# Patient Record
Sex: MALE | NOT HISPANIC OR LATINO | Employment: FULL TIME | URBAN - METROPOLITAN AREA
[De-identification: names, ages, dates, MRNs, and addresses within clinical notes are randomized per-mention and may not be internally consistent; named-entity substitution may affect disease eponyms.]

---

## 2024-01-29 ENCOUNTER — CLINICAL SUPPORT (OUTPATIENT)
Dept: AUDIOLOGY | Facility: CLINIC | Age: 69
End: 2024-01-29
Payer: COMMERCIAL

## 2024-01-29 ENCOUNTER — CLINICAL SUPPORT (OUTPATIENT)
Dept: AUDIOLOGY | Facility: CLINIC | Age: 69
End: 2024-01-29
Payer: MEDICARE

## 2024-01-29 DIAGNOSIS — H93.13 TINNITUS OF BOTH EARS: ICD-10-CM

## 2024-01-29 DIAGNOSIS — H90.3 SENSORINEURAL HEARING LOSS (SNHL) OF BOTH EARS: Primary | ICD-10-CM

## 2024-01-29 PROCEDURE — HRANC PR HEARING AID NO CHARGE: Performed by: AUDIOLOGIST

## 2024-01-29 NOTE — PROGRESS NOTES
Hearing Aid Evaluation  Time of Appointment:  Chief Complaint   Patient presents with    Hearing Loss     HAE / HCS       This patient was seen for an HAE. The patient has a benefit through [employer].    Demonstrated Phonak Audeo hearing aids in the office today.  The patient was pleased with the sound and fit.    Order placed today: 2 PHONAK AUDEO L-90 R WITH #1 LENGTH M RECEIVERS AND SMALL OPEN DOMES IN P6 COLOR.    The patient paid NO CHARGE for today's visit.    Return for hearing aid fitting that was scheduled today FOR [facility].    Time of Appointment: 35 MINUTES

## 2024-01-29 NOTE — PROGRESS NOTES
AUDIOLOGY ADULT AUDIOMETRIC EVALUATION    Name:  Dimas Pérez  :  1955  Age:  68 y.o.  Date of Evaluation:  2024    Reason for visit: Patient is seen in the clinic today at the request of otolaryngology for an audiologic evaluation.     HISTORY  Patient complains of not hearing well now and ready for hearing aids. Patient has [employer] for a benefit.    EVALUATION  See scanned audiogram: “Media” > “Audiology Report”.      RESULTS  Otoscopic Evaluation:  Right Ear: clear ear canal  Left Ear: clear ear canal    Immittance Measures:  Tympanometry:  Right Ear: Type A, normal tympanic membrane mobility with normal middle ear pressure   Left Ear: Type A, normal tympanic membrane mobility with normal middle ear pressure     Acoustic Reflexes:  Ipsilateral Right Ear:  NR NR NR NR   Ipsilateral Left Ear:    NR NR NR NR  Contralateral Right Ear: did not evaluate  Contralateral Left Ear: did not evaluate    Distortion Product Otoacoustic Emissions (DPOAEs):  Right Ear: DNT  Left Ear:   DNT     Audiometry:  Test Technique and Reliability: BEHAVIORAL   Standard audiometry via supra-aural headphones. Reliability is good/ FAIR.  FALSE POSITIVES    Pure tone air and bone conduction audiometry:  Right Ear: WNL TO 1 K HZ WITH A MILD MODERATELY SEVERE SNHL STABLE FROM 2023  Left Ear: WNL TO 1 K HZ AND MILD TO MODERATE TO SEVERE SNHL STABLE FROM 2023    Speech Audiometry (Word Recognition Scores):   Right Ear: 96% GOOD  Left Ear:    96% GOOD    IMPRESSIONS  STABLE MILD MODERATE TO SEVERE SNHL IN BOTH EARS.  The presence of acoustic reflexes within normal intensity limits is consistent with normal middle ear and brainstem function, and suggests that auditory sensitivity is not significantly impaired. An elevated or absent acoustic reflex threshold is consistent with a middle ear disorder, hearing loss in the stimulated ear, and/or interruption of neural innervation of the stapedius muscle. Present DPOAEs  suggest normal/near normal cochlear outer hair cell function and are consistent with no greater than a mild hearing loss at those frequencies. Absent DPOAEs are consistent with abnormal cochlear outer hair cell function and some degree of hearing loss at those frequencies.    RECOMMENDATIONS  - Follow up with otolaryngology today as scheduled.  - Audiologic evaluation as needed.  - Annual audiologic evaluation, sooner if an acute change is noted.  - Audiologic evaluation in conjunction with otologic care, if an acute change is noted, and/or annually.  - Consider hearing aids. [facility]  HAE TODAY AFTER AUDIO. - Follow-up with audiology annually for routine hearing aid maintenance, sooner if questions/problems arise.  - Follow-up with medical care team as planned.    PATIENT EDUCATION  Discussed results, impressions and recommendations with the patient. Questions were addressed and the patient was encouraged to contact our office should concerns arise.    Time for this encounter: 35 MINUTES

## 2024-02-19 ENCOUNTER — CLINICAL SUPPORT (OUTPATIENT)
Dept: AUDIOLOGY | Facility: CLINIC | Age: 69
End: 2024-02-19
Payer: COMMERCIAL

## 2024-02-19 DIAGNOSIS — H90.3 SENSORINEURAL HEARING LOSS (SNHL) OF BOTH EARS: Primary | ICD-10-CM

## 2024-02-19 PROCEDURE — HRANC PR HEARING AID NO CHARGE: Performed by: AUDIOLOGIST

## 2024-02-19 NOTE — PROGRESS NOTES
HEARING AID FITTING  [facility]    TIME OF APPOINTMENT: 9:00- 10:00    RIGHT: PHONAK AUDEO L 90 R #1 LENGTH , M  AND LARGE OPEN DOME  SN: [id number]  LEFT:PHONAK AUDEO L 90 R #1 LENGTH , M  AND LARGE OPEN DOME  SN: [id number]  FIT DATE:2/19/24  60 DAY TRIAL PERIOD: 4/19 2024  WARRANTY: 2/19/2027    This patient was seen for a H A FITTING:  VERY PLEASANT COUPLE HERE TODAY FOR MR. JUAREZ'S HEARING AID FITTING THROUGH [facility].  PATIENT LEARNED WELL HOW TO INSERT AND REMOVE AIDS WITH AND WITHOUT A MIRROR.  HE IS SET CURRENTLY AT 90 % OF [employer] AND DID RUN FB  ON BOTH AIDS. NO OTHER CHANGES.  SHOWED PATIENT AND HE LEARNED USE OF VC AND CLEAN AND CHECK AND WAX FILTER CHANGE.   WILL REVIEW ALL OF THE ABOVE IN 2 WEEEK CHECK AND GAVE PATIENT PHONE NUMBER FOR PHONAK IF DESIRES TO CONNECT FOR PHONE CALLS OR HOLLIE.    Set the devices to 90%. PATIENT SEEMS VERY WELL ADJUSTED TO AIDS ALREADY AND SPOUSE IS SUPPORTIVE AND KNOWLEDGABLE ABOUT THE AIDS.    Signed the Purchase Agreement and HIPAA forms.  The patient manipulated the devices well.  They were pleased with the sound and fit. SENT COPY OF  AND LETICIAA TO [facility] AND SCANNED INTO [employer].    The patient paid their balance in full AT TIME OF ORDER.  Return in 2 weeks to review changing WaxTraps, or sooner if needed.    [facility] WILL PAY EENT $625.00 EACH IF PATIENT KEEPS AIDS IN 2 MONTHS TIME.

## 2024-03-04 ENCOUNTER — CLINICAL SUPPORT (OUTPATIENT)
Dept: AUDIOLOGY | Facility: CLINIC | Age: 69
End: 2024-03-04
Payer: COMMERCIAL

## 2024-03-04 DIAGNOSIS — H90.3 SENSORINEURAL HEARING LOSS (SNHL) OF BOTH EARS: Primary | ICD-10-CM

## 2024-03-04 PROCEDURE — HRANC PR HEARING AID NO CHARGE: Performed by: AUDIOLOGIST

## 2024-03-04 NOTE — PROGRESS NOTES
HEARING AID CHECK    RIGHT:RIGHT: PHONAK AUDEO L 90 R #1 LENGTH , M  AND LARGE OPEN DOME  SN: [id number]  LEFT:PHONAK AUDEO L 90 R #1 LENGTH , M  AND LARGE OPEN DOME  SN: [id number]  FIT DATE:2/19/24  60 DAY TRIAL PERIOD: 4/19 2024  WARRANTY: 2/19/2027   Today: 3/4/24    This patient was seen for a H A check #1.  VERY PLEASANT COUPLE HERE TODAY FOR MR. JUAREZ'S HEARING AID FITTING THROUGH [facility].  PATIENT LEARNED WELL HOW TO INSERT AND REMOVE AIDS WITH AND WITHOUT A MIRROR.  HE IS SET today   % OF TARGET AND increased s-I-n overall as in lots of noise in daily life. NO OTHER CHANGES.  SHOWED PATIENT AND HE LEARNED USE OF VC AND CLEAN AND CHECK AND WAX FILTER CHANGE.   WILL REVIEW ALL OF THE ABOVE IN 2 WEEEK CHECK AND GAVE PATIENT PHONE NUMBER FOR PHONAK IF DESIRES TO CONNECT FOR PHONE CALLS OR HOLLIE.  Will do Speechmapping in 2 weeks.      Otoscopy:  clear TM      The patient paid  NO CHARGE  for today's visit.  Return AS SCHEDULED.    APPOINTMENT TIME: 10:30- 11:10

## 2024-04-22 ENCOUNTER — OFFICE VISIT (OUTPATIENT)
Dept: PRIMARY CARE | Facility: CLINIC | Age: 69
End: 2024-04-22
Payer: COMMERCIAL

## 2024-04-22 ENCOUNTER — APPOINTMENT (OUTPATIENT)
Dept: AUDIOLOGY | Facility: CLINIC | Age: 69
End: 2024-04-22

## 2024-04-22 VITALS
WEIGHT: 183.6 LBS | BODY MASS INDEX: 27.92 KG/M2 | HEART RATE: 57 BPM | OXYGEN SATURATION: 98 % | DIASTOLIC BLOOD PRESSURE: 78 MMHG | SYSTOLIC BLOOD PRESSURE: 136 MMHG | TEMPERATURE: 97.6 F

## 2024-04-22 DIAGNOSIS — N52.9 ERECTILE DYSFUNCTION, UNSPECIFIED ERECTILE DYSFUNCTION TYPE: ICD-10-CM

## 2024-04-22 DIAGNOSIS — J02.9 ACUTE PHARYNGITIS, UNSPECIFIED ETIOLOGY: Primary | ICD-10-CM

## 2024-04-22 PROBLEM — Z94.2 HISTORY OF LUNG TRANSPLANT (MULTI): Chronic | Status: ACTIVE | Noted: 2022-04-22

## 2024-04-22 PROBLEM — H90.3 SENSORINEURAL HEARING LOSS, BILATERAL: Status: ACTIVE | Noted: 2024-04-22

## 2024-04-22 PROBLEM — K21.00 GASTROESOPHAGEAL REFLUX DISEASE WITH ESOPHAGITIS WITHOUT HEMORRHAGE: Chronic | Status: ACTIVE | Noted: 2022-04-22

## 2024-04-22 PROBLEM — I10 ESSENTIAL HYPERTENSION: Chronic | Status: ACTIVE | Noted: 2022-04-22

## 2024-04-22 PROBLEM — F41.9 ANXIETY DISORDER: Status: ACTIVE | Noted: 2024-04-22

## 2024-04-22 PROBLEM — N18.30 CKD (CHRONIC KIDNEY DISEASE) STAGE 3, GFR 30-59 ML/MIN (MULTI): Chronic | Status: ACTIVE | Noted: 2018-11-27

## 2024-04-22 PROBLEM — E78.00 HIGH CHOLESTEROL: Status: ACTIVE | Noted: 2024-04-22

## 2024-04-22 PROBLEM — J43.8 OTHER EMPHYSEMA (MULTI): Status: ACTIVE | Noted: 2024-04-22

## 2024-04-22 PROBLEM — E78.5 DYSLIPIDEMIA: Status: ACTIVE | Noted: 2024-04-22

## 2024-04-22 PROBLEM — J45.909 ASTHMA (HHS-HCC): Status: ACTIVE | Noted: 2024-04-22

## 2024-04-22 PROBLEM — J44.89: Status: ACTIVE | Noted: 2024-04-22

## 2024-04-22 LAB — POC RAPID STREP: NEGATIVE

## 2024-04-22 PROCEDURE — 1159F MED LIST DOCD IN RCRD: CPT | Performed by: FAMILY MEDICINE

## 2024-04-22 PROCEDURE — 87081 CULTURE SCREEN ONLY: CPT | Performed by: FAMILY MEDICINE

## 2024-04-22 PROCEDURE — 3075F SYST BP GE 130 - 139MM HG: CPT | Performed by: FAMILY MEDICINE

## 2024-04-22 PROCEDURE — 3078F DIAST BP <80 MM HG: CPT | Performed by: FAMILY MEDICINE

## 2024-04-22 PROCEDURE — 1036F TOBACCO NON-USER: CPT | Performed by: FAMILY MEDICINE

## 2024-04-22 PROCEDURE — 87880 STREP A ASSAY W/OPTIC: CPT | Performed by: FAMILY MEDICINE

## 2024-04-22 PROCEDURE — 99214 OFFICE O/P EST MOD 30 MIN: CPT | Performed by: FAMILY MEDICINE

## 2024-04-22 PROCEDURE — 1125F AMNT PAIN NOTED PAIN PRSNT: CPT | Performed by: FAMILY MEDICINE

## 2024-04-22 RX ORDER — POLYETHYLENE GLYCOL-3350 AND ELECTROLYTES 236; 6.74; 5.86; 2.97; 22.74 G/274.31G; G/274.31G; G/274.31G; G/274.31G; G/274.31G
4000 POWDER, FOR SOLUTION ORAL ONCE
COMMUNITY
Start: 2023-11-14 | End: 2024-04-22 | Stop reason: ALTCHOICE

## 2024-04-22 RX ORDER — SILDENAFIL 100 MG/1
100 TABLET, FILM COATED ORAL DAILY PRN
Qty: 12 TABLET | Refills: 3 | Status: SHIPPED | OUTPATIENT
Start: 2024-04-22 | End: 2025-04-22

## 2024-04-22 RX ORDER — AMOXICILLIN 500 MG/1
500 CAPSULE ORAL EVERY 12 HOURS SCHEDULED
Qty: 14 CAPSULE | Refills: 0 | Status: SHIPPED | OUTPATIENT
Start: 2024-04-22 | End: 2024-04-29

## 2024-04-22 RX ORDER — PANTOPRAZOLE SODIUM 40 MG/1
40 TABLET, DELAYED RELEASE ORAL EVERY 24 HOURS
COMMUNITY

## 2024-04-22 RX ORDER — ERGOCALCIFEROL 1.25 MG/1
50000 CAPSULE ORAL WEEKLY
COMMUNITY
Start: 2023-09-21

## 2024-04-22 RX ORDER — MONTELUKAST SODIUM 10 MG/1
10 TABLET ORAL
COMMUNITY

## 2024-04-22 RX ORDER — MYCOPHENOLATE MOFETIL 250 MG/1
500 CAPSULE ORAL 2 TIMES DAILY
COMMUNITY
Start: 2023-09-21

## 2024-04-22 RX ORDER — CALCIUM CARBONATE 200(500)MG
1 TABLET,CHEWABLE ORAL 4 TIMES DAILY PRN
COMMUNITY
Start: 2022-04-27

## 2024-04-22 RX ORDER — METOPROLOL TARTRATE 25 MG/1
25 TABLET, FILM COATED ORAL 2 TIMES DAILY
COMMUNITY
Start: 2023-09-21

## 2024-04-22 RX ORDER — DOCUSATE SODIUM 100 MG/1
100 CAPSULE, LIQUID FILLED ORAL EVERY 24 HOURS
COMMUNITY

## 2024-04-22 RX ORDER — LORATADINE 10 MG/1
10 TABLET ORAL EVERY 24 HOURS
COMMUNITY

## 2024-04-22 RX ORDER — NAPROXEN SODIUM 220 MG/1
81 TABLET, FILM COATED ORAL DAILY
COMMUNITY
Start: 2016-08-13

## 2024-04-22 RX ORDER — TACROLIMUS 0.5 MG/1
0.5 CAPSULE ORAL 2 TIMES DAILY
COMMUNITY
Start: 2024-02-13 | End: 2025-02-12

## 2024-04-22 RX ORDER — ROSUVASTATIN CALCIUM 5 MG/1
5 TABLET, COATED ORAL
COMMUNITY
Start: 2023-12-11

## 2024-04-22 RX ORDER — MULTIVITAMIN
1 TABLET ORAL
COMMUNITY
Start: 2021-01-20

## 2024-04-22 RX ORDER — FOLIC ACID 1 MG/1
1 TABLET ORAL DAILY
COMMUNITY
Start: 2023-09-21

## 2024-04-22 ASSESSMENT — PATIENT HEALTH QUESTIONNAIRE - PHQ9
2. FEELING DOWN, DEPRESSED OR HOPELESS: NOT AT ALL
SUM OF ALL RESPONSES TO PHQ9 QUESTIONS 1 AND 2: 0
1. LITTLE INTEREST OR PLEASURE IN DOING THINGS: NOT AT ALL

## 2024-04-22 ASSESSMENT — PAIN SCALES - GENERAL: PAINLEVEL: 6

## 2024-04-22 ASSESSMENT — ENCOUNTER SYMPTOMS
DEPRESSION: 0
LOSS OF SENSATION IN FEET: 0
OCCASIONAL FEELINGS OF UNSTEADINESS: 0

## 2024-04-22 NOTE — PROGRESS NOTES
Subjective   Patient ID: Dimas Pérez is a 68 y.o. male who presents for Sore Throat (X 5 days).    HPI   He presents with a sore throat through the weekend.  He does not have much cough.  Not sure if he had a fever.  No headache.  No ear pain.    He would like a prescription for Viagra.  He has used this in the past.  Try to get online but it became too expensive.  States that is effective though the stuff that he got online was not very effective for him.    Review of Systems    Objective   /78   Pulse 57   Temp 36.4 °C (97.6 °F) (Oral)   Wt 83.3 kg (183 lb 9.6 oz)   SpO2 98%   BMI 27.92 kg/m²     Physical Exam  Vitals reviewed.   Constitutional:       Appearance: He is not toxic-appearing.   HENT:      Right Ear: Tympanic membrane and ear canal normal.      Left Ear: Tympanic membrane and ear canal normal.      Nose: No congestion or rhinorrhea.      Mouth/Throat:      Mouth: Mucous membranes are moist.      Pharynx: Oropharynx is clear.   Eyes:      Conjunctiva/sclera: Conjunctivae normal.   Pulmonary:      Effort: Pulmonary effort is normal. No respiratory distress.      Breath sounds: No wheezing or rhonchi.   Musculoskeletal:      Cervical back: No rigidity.   Lymphadenopathy:      Cervical: No cervical adenopathy.   Neurological:      Mental Status: He is alert.   Psychiatric:         Mood and Affect: Mood normal.         Assessment/Plan   Diagnoses and all orders for this visit:  Acute pharyngitis, unspecified etiology  -     amoxicillin (Amoxil) 500 mg capsule; Take 1 capsule (500 mg) by mouth every 12 hours for 7 days.  Erectile dysfunction, unspecified erectile dysfunction type  -     sildenafil (Viagra) 100 mg tablet; Take 1 tablet (100 mg) by mouth once daily as needed for erectile dysfunction.  I am going to go ahead and treat this is strep pharyngitis.  He will let me know if this does not resolve the symptoms.  The meantime, we will run a culture, 2, cases comes back positive and his  wife gets sick with a sore throat we will treat her.    Use the Viagra 100 mg 1 hour before sex.  I told him not to use full stomach as that may delay the absorption.  If it is effective at 100 mg, he cut back to 50 and see if that is just as effective.  If not effective, he will let me know and we can give a trial of Cialis.

## 2024-04-22 NOTE — PATIENT INSTRUCTIONS
Will treat as Strep throat. Call if not improving this week.     Take the viagra at 100 mg one hour before sex. If not helpful, let me know.             PAIN MANAGEMENT IN ADULTS    What is pain? Pain is your body’s way to reacting to injury or illness. Everybody reacts to pain in different ways. What you think is painful may not be painful to someone else. But, pain is whatever you say it is!  What causes pain? Many things, such as an injury, surgery, or a disease can cause pain. Some pain is caused by pressure one a nerve, such as a cancerous tumor or slipped disc. Other pain is caused when nerves are cut as in an accident or surgery. After an injury or surgery you may not want to move the painful part of our body at all. But, you may have pain because you are not moving this body part. Sometimes there is no clear reason for your pain.    What are the different types of pain?  Acute pain is short?lived and lasts less than 3 months. Caregivers help first work to remove the cause of the pain, such as fixing a broken arm. Acute pain usually can be controlled or stopped with pain medicine.  Chronic pain lasts longer than 3?6 months. This kind of pain is often more complicated. Caregivers may use medicine along with other treatments, like self?hypnosis and relaxation to help you learn to deal with the chronic pain.  What is your pain like? Caregivers want you to talk to them about your pain. This helps them learn what may be causing the pain and how best to treat it.  Why is pain control important? Pain can affect your appetite, how well you sleep, your energy and your ability to do things. Pain can also affect your mood and relationship with others. If caregivers can help you control your pain, you will suffer less and can even heal faster.  How can pain medicine be given? Following are the many different ways pain medicine can be given depending on the kind of pain you are experiencing.  By mouth: you may be given  pills or liquid to swallow or you may be given a pill or liquid to put under your tongue. Some medicine can also be given as a lozenge like a cough drop or even a special lollipop.  Rectal: medicine in a suppository is put into your rectum.  Shot/Injection: pain medicine can be given as a shot/injection into an IV, into a muscle or under the skin  Topical: medicine in a cream or gel is spread over the skin.  Transdermal: medicine given in a patch and put onto the skin. This medicine is released slowly to give pain relief for as long as 72 hours.    How can you take pain medicine safely and make it work best for you? The following are many different ways pain medicine can be given depending on the kind of pain you have.  Some pain medicines can make you breathe less deeply and less often. The medicine may also make you sleepy, dizzy, and unsafe to drive a car or use heavy equipment. For these reasons, it is very important to follow your caregiver’s advice on how to use this medicine.  Sometimes the pain is worse when you first wake up in the morning. This may happened if you did not have enough pain medicine in your blood stream to last through the night. Caregivers may tell you to take a dose of pain medicine during the night.  Some foods, alcohol, and other medicines may cause unpleasant side effects when you take pain medicine. Follow your caregiver’s advice about how to prevent these problems.  Pain medicine can make you constipated. Straining with a BM can make you pain worse. Do not try to push the BM out if it’s too hard. Contact your caregiver if you become constipated.  Other non?drug pain control methods. There are many pain control techniques that can held you deal with pain even if it does not go away completely. It is important to practice some of the techniques when you don’t have pain if possible. This will help the technique work better during an attack of pain.  Cold and heat: both cold and heat can  help lessen some types of post?op pain. Caregivers will tell you if cold and/or hot packs will help your pain.  Distraction: teaches you to focus your attention on something other than pain. Playing cards or games, talking and visiting family may relax you and keep you from thinking about pain.  Hydrotherapy: is a gentle water exercise program. It can strengthen muscles that are not injured and lessen inflammation. Talk to your physical therapist or your caregiver about starting a hydrotherapy program.  Massage  Music  Physical therapy  Rest  Surgery/Nerve blocks  Call your caregiver if you have any of the following problems:  The pain medicine you are taking makes you sleepier than usual or confused  You have new pain or the pain seems different than before  You have constipation  Care agreement: You have the right to help plan your care. To help with this plan you must learn about your pain, what is causing it, and how it can be treated. You can then discuss treatment options with your caregivers. Work with them to decide what care will be used to treat you. You always have the right to refuse treatment.

## 2024-04-25 ENCOUNTER — TELEPHONE (OUTPATIENT)
Dept: PRIMARY CARE | Facility: CLINIC | Age: 69
End: 2024-04-25
Payer: COMMERCIAL

## 2024-04-25 LAB — S PYO THROAT QL CULT: NORMAL

## 2024-07-09 ENCOUNTER — OFFICE VISIT (OUTPATIENT)
Dept: PRIMARY CARE | Facility: CLINIC | Age: 69
End: 2024-07-09
Payer: MEDICARE

## 2024-07-09 ENCOUNTER — HOSPITAL ENCOUNTER (OUTPATIENT)
Dept: RADIOLOGY | Facility: HOSPITAL | Age: 69
Discharge: HOME | End: 2024-07-09
Payer: MEDICARE

## 2024-07-09 VITALS
DIASTOLIC BLOOD PRESSURE: 74 MMHG | HEART RATE: 56 BPM | BODY MASS INDEX: 27.34 KG/M2 | SYSTOLIC BLOOD PRESSURE: 138 MMHG | WEIGHT: 179.8 LBS

## 2024-07-09 DIAGNOSIS — M79.661 RIGHT CALF PAIN: Primary | ICD-10-CM

## 2024-07-09 DIAGNOSIS — R22.41 LOCALIZED SWELLING OF RIGHT FOOT: ICD-10-CM

## 2024-07-09 DIAGNOSIS — M79.661 RIGHT CALF PAIN: ICD-10-CM

## 2024-07-09 PROBLEM — J30.9 ALLERGIC RHINITIS: Status: ACTIVE | Noted: 2024-07-09

## 2024-07-09 PROBLEM — K21.9 GASTROESOPHAGEAL REFLUX DISEASE: Status: ACTIVE | Noted: 2024-07-09

## 2024-07-09 PROCEDURE — 93971 EXTREMITY STUDY: CPT

## 2024-07-09 PROCEDURE — 3078F DIAST BP <80 MM HG: CPT | Performed by: FAMILY MEDICINE

## 2024-07-09 PROCEDURE — 1159F MED LIST DOCD IN RCRD: CPT | Performed by: FAMILY MEDICINE

## 2024-07-09 PROCEDURE — 99213 OFFICE O/P EST LOW 20 MIN: CPT | Mod: 25 | Performed by: FAMILY MEDICINE

## 2024-07-09 PROCEDURE — 1124F ACP DISCUSS-NO DSCNMKR DOCD: CPT | Performed by: FAMILY MEDICINE

## 2024-07-09 PROCEDURE — 1036F TOBACCO NON-USER: CPT | Performed by: FAMILY MEDICINE

## 2024-07-09 PROCEDURE — 93971 EXTREMITY STUDY: CPT | Performed by: RADIOLOGY

## 2024-07-09 PROCEDURE — 99213 OFFICE O/P EST LOW 20 MIN: CPT | Performed by: FAMILY MEDICINE

## 2024-07-09 PROCEDURE — 1125F AMNT PAIN NOTED PAIN PRSNT: CPT | Performed by: FAMILY MEDICINE

## 2024-07-09 PROCEDURE — 1160F RVW MEDS BY RX/DR IN RCRD: CPT | Performed by: FAMILY MEDICINE

## 2024-07-09 PROCEDURE — 3075F SYST BP GE 130 - 139MM HG: CPT | Performed by: FAMILY MEDICINE

## 2024-07-09 RX ORDER — SULFAMETHOXAZOLE AND TRIMETHOPRIM 800; 160 MG/1; MG/1
0.5 TABLET ORAL EVERY OTHER DAY
COMMUNITY
Start: 2024-06-20

## 2024-07-09 ASSESSMENT — PATIENT HEALTH QUESTIONNAIRE - PHQ9
SUM OF ALL RESPONSES TO PHQ9 QUESTIONS 1 AND 2: 0
2. FEELING DOWN, DEPRESSED OR HOPELESS: NOT AT ALL
1. LITTLE INTEREST OR PLEASURE IN DOING THINGS: NOT AT ALL

## 2024-07-09 ASSESSMENT — PAIN SCALES - GENERAL: PAINLEVEL: 7

## 2024-07-09 NOTE — PROGRESS NOTES
Subjective   Patient ID: Dimas Pérez is a 68 y.o. male who presents for Right leg pain (Pulled calf muscle 2 weeks ago).    HPI   Presents with right calf pain about the last 2 weeks.  He states he was just standing in his doorway talking and turned and suddenly felt like somebody stabbed the right in the calf.  Since that he just had pain back there even though he tried to walk it out.  He states that his calf began to get larger which was his concern and which is why he is here today.    Review of Systems  No chest pain.  He has had no periods of long inactivity.    Objective   /74   Pulse 56   Wt 81.6 kg (179 lb 12.8 oz)   BMI 27.34 kg/m²     Physical Exam  Constitutional:       Appearance: Normal appearance. He is not ill-appearing.   Pulmonary:      Effort: Pulmonary effort is normal.   Musculoskeletal:      Right lower leg: Edema (Right calf measures 40 cm while the left calf measures 38) present.      Left lower leg: No edema.   Neurological:      Mental Status: He is alert.      Gait: Gait abnormal (Limps on that right leg).   Psychiatric:         Mood and Affect: Mood normal.         Behavior: Behavior normal.         Assessment/Plan   Diagnoses and all orders for this visit:  Right calf pain  -     Vascular US lower extremity venous duplex right; Future  Localized swelling of right foot  -     Vascular US lower extremity venous duplex right; Future  Will start with the ultrasound to be certain he does not have a blood clot especially since he has any swelling in the foot.  If that is negative, I want him to go to physical therapy and see if they can resolve this.

## 2024-07-09 NOTE — PATIENT INSTRUCTIONS
Let's check an ultrasound to be sure you don't have a blood clot.  If not, set up physical therapy.

## 2024-07-15 NOTE — PROGRESS NOTES
Physical Therapy Examination and Treatment Note    Patient Name: Dimas Pérez  MRN Number: [id number]  Evaluating Clinician: ZABRINA Solano PT  Today's Date: 7/16/2024  Time Calculation  Start Time: 0930  Stop Time: 1015  Time Calculation (min): 45 min    INSURANCE:  Visit Number: 1  Approved Visits: ?  Insurance Info: EVAL ONLY - [facility] - AUTH THRU [employer] / $35 COPAY / OOP $4200 - $208 met / [employer] [id number] / ds 7/15/24.     PRECAUTIONS/SPECIAL CONCERNS/RELEVANT PMHX: CKD, Lung Transplant, asthma, anxiety, osteoporosis, COPD, emphysema,     PT CLINICAL PROBLEM: right calf pain/strain    Chief Medical Dx code: M79.661 (ICD-10-CM) - Right calf pain   1. Right calf pain  Referral to Physical Therapy    Follow Up In Physical Therapy          SUBJECTIVE: twisted to turn 3 weeks ago and felt a severe pain in the right mid calf. He had significant difficulty to walk and and do stairs and needed to walk steps one at a time for 2 weeks. Currently walking slowly and carefully. 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 He has been improving. No symptoms at rest at present or when inactive. He has tried aspirin and saw PMD and advised heat and PT. He feels okay to drive and drove to PT today. Walks without devices. He had considerable swelling in the beginning and had and ultrasound done to rule blood clot and his ankle swelling has gone down some. He tries to play golf once per week, works in his yard and tends to his swimming pool.     TREATMENT:  Symptoms/NPRS before Rx: 4  Symptoms/NPRS after Rx: 2    Timed Codes: (# minutes per modality and 0 if omitted)  Ther-Ex 65681:   Manual 76906: 12  NMRe-ed 38055:  Gait 76523:  Stim 46458:  Traction 64645:    Prone massage STM right calf and effleurage 12  BTB gas pedal 3 x 10 reps   Long sitting calf stretch 3 x 30 with towel      OBJECTIVE:  STEADI Fall Risk: 0 (score of 4+ indicates fall risk)   OUTCOME TOOL: LEFS 76/80  Calf girth right 40 cm and left  38.8 cm  Full knee rom no swelling no deformity  Tender mid to lower calf and spasm medial gastroc  Min ankle swelling  Full ankle motion but DF to 20 provokes calf stretch discomfort  DF 5  PF 4- discomfort  Gait min antalgia    ASSESSMENT: Right calf strain mid to distal. Achilles is fine non tender and no defect.      Patient presents with low tissue reactivity,  low condition irritability, and low subject reactivity with impairments noted below and decreased level of functional abilities and would benefit from skilled PT to address limitations and achieve goals noted below.     PLAN: right calf rx manual massage taping light stretch band exercises heat     Patient/parent/caregiver agreed and consented to plan of care for skilled physical therapy at 1-2 times per week for 4 weeks. Physical Therapy to include modalities prn as indicated, therapeutic exercise, manual therapy, neuromuscular re-education, gait and functional performance training, instruction and practice in a home exercise program (HEP) and self-management skills.     CARE PLAN/GOALS: (met, progressing, not progressing)    STG's: (      weeks /      visits )  1  2    LTG's  (   4-6   weeks /      visits )  1 LEFS 100% 80/80  2 able to calf raise 10x indicating normal functional strength  3 able to walk stairs reciprocally no pain or limitations  4 normal gait non antalgic  5 The Global Rating of Change Score (GROC) will improve to 5/7 =  “a good deal better” or more.   6 Improve functional outcome tool score (FOTS: RAMIREZ, NDI, ASES, ABC, etc.) by > 10 points for Minimally Important Clinical Difference (MICD).  7 Patient/client will be independent with a HEP and self-management skills to further enhance recovery and progress.  8 Patient/client will verbalize >75% symptom reduction for frequency and severity of symptoms and/or > two point reduction of VAS/NPRS.  9 The Patient Specific Functional Scale metric (PSFS) will improve from baseline value to  greater than 80% functional.

## 2024-07-16 ENCOUNTER — EVALUATION (OUTPATIENT)
Dept: PHYSICAL THERAPY | Facility: CLINIC | Age: 69
End: 2024-07-16
Payer: MEDICARE

## 2024-07-16 DIAGNOSIS — M79.661 RIGHT CALF PAIN: ICD-10-CM

## 2024-07-16 PROCEDURE — 97161 PT EVAL LOW COMPLEX 20 MIN: CPT | Mod: GP

## 2024-07-16 PROCEDURE — 97140 MANUAL THERAPY 1/> REGIONS: CPT | Mod: GP

## 2024-07-25 ENCOUNTER — TREATMENT (OUTPATIENT)
Dept: PHYSICAL THERAPY | Facility: CLINIC | Age: 69
End: 2024-07-25
Payer: MEDICARE

## 2024-07-25 DIAGNOSIS — M79.661 RIGHT CALF PAIN: ICD-10-CM

## 2024-07-25 PROCEDURE — 97140 MANUAL THERAPY 1/> REGIONS: CPT | Mod: GP

## 2024-07-25 PROCEDURE — 97110 THERAPEUTIC EXERCISES: CPT | Mod: GP

## 2024-07-25 NOTE — PROGRESS NOTES
Physical Therapy Examination and Treatment Note    Patient Name: Dimas Pérez  MRN Number: [id number]  Evaluating Clinician: ZABRINA Solano PT  Today's Date: 7/25/2024  Time Calculation  Start Time: 0930  Stop Time: 1027  Time Calculation (min): 57 min    INSURANCE:  Visit Number: 2 of 6  Approved Visits: -Auth Rcvd 5 visits 7/22-9/19 CPTs [id number]    Insurance Info: EVAL ONLY - [facility] [facility] - AUTH THRU [employer] / $35 COPAY / OOP $4200 - $208 met / AVAILITY [id number] / ds 7/15/24.     PRECAUTIONS/SPECIAL CONCERNS/RELEVANT PMHX: CKD, Lung Transplant, asthma, anxiety, osteoporosis, COPD, emphysema,     PT CLINICAL PROBLEM: right calf pain/strain    Chief Medical Dx code: M79.661 (ICD-10-CM) - Right calf pain   1. Right calf pain  Follow Up In Physical Therapy          SUBJECTIVE: twisted to turn 3 weeks ago and felt a severe pain in the right mid calf. He had significant difficulty to walk and and do stairs and needed to walk steps one at a time for 2 weeks. Currently walking slowly and carefully. 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 He has been improving. No symptoms at rest at present or when inactive. He has tried aspirin and saw PMD and advised heat and PT. He feels okay to drive and drove to PT today. Walks without devices. He had considerable swelling in the beginning and had and ultrasound done to rule blood clot and his ankle swelling has gone down some. He tries to play golf once per week, works in his yard and tends to his swimming pool.     7/25/24 no longer painful just some intermittent soreness in the calf able to play golf again just not 100%    TREATMENT:  Symptoms/NPRS before Rx:  Symptoms/NPRS after Rx:     Timed Codes: (# minutes per modality and 0 if omitted)  Ther-Ex 94337: 22  Manual 49990: 30  NMRe-ed 40783:  Gait 57239:  Stim 07303:  Traction 30733:    7/16/23  Prone massage STM right calf and effleurage 12  BTB gas pedal 3 x 10 reps   Long  sitting calf stretch 3 x 30 with towel  Banded ev/inv/DF/PF 3 x 10 each  Standing light slant calf stretch  Seated soleus heel raises 10 lbs cuff at thigh  EDU compression socks for pt. Concerns of swelling in foot, ankle, and calf and/or calf compression sleeve for support in functional activities    OBJECTIVE:  STEADI Fall Risk: 0 (score of 4+ indicates fall risk)   OUTCOME TOOL: LEFS 76/80  Calf girth right 40 cm and left 38.8 cm  Full knee rom no swelling no deformity  Tender mid to lower calf and spasm medial gastroc  Min ankle swelling  Full ankle motion but DF to 20 provokes calf stretch discomfort  DF 5  PF 4- discomfort  Gait min antalgia    ASSESSMENT: decreased calf push off in gait noted     Patient presents with low tissue reactivity,  low condition irritability, and low subject reactivity with impairments noted below and decreased level of functional abilities and would benefit from skilled PT to address limitations and achieve goals noted below.     PLAN: right calf rx manual massage taping light stretch band exercises heat     Patient/parent/caregiver agreed and consented to plan of care for skilled physical therapy at 1-2 times per week for 4 weeks. Physical Therapy to include modalities prn as indicated, therapeutic exercise, manual therapy, neuromuscular re-education, gait and functional performance training, instruction and practice in a home exercise program (HEP) and self-management skills.     CARE PLAN/GOALS: (met, progressing, not progressing)    STG's: (      weeks /      visits )  1  2    LTG's  (   4-6   weeks /      visits )  1 LEFS 100% 80/80  2 able to calf raise 10x indicating normal functional strength  3 able to walk stairs reciprocally no pain or limitations  4 normal gait non antalgic  5 The Global Rating of Change Score (GROC) will improve to 5/7 =  “a good deal better” or more.   6 Improve functional outcome tool score (FOTS: RAMIREZ, NDI, ASES, ABC, etc.) by > 10 points for  Minimally Important Clinical Difference (MICD).  7 Patient/client will be independent with a HEP and self-management skills to further enhance recovery and progress.  8 Patient/client will verbalize >75% symptom reduction for frequency and severity of symptoms and/or > two point reduction of VAS/NPRS.  9 The Patient Specific Functional Scale metric (PSFS) will improve from baseline value to greater than 80% functional.

## 2024-08-01 ENCOUNTER — TREATMENT (OUTPATIENT)
Dept: PHYSICAL THERAPY | Facility: CLINIC | Age: 69
End: 2024-08-01
Payer: MEDICARE

## 2024-08-01 DIAGNOSIS — M79.661 RIGHT CALF PAIN: ICD-10-CM

## 2024-08-01 PROCEDURE — 97110 THERAPEUTIC EXERCISES: CPT | Mod: GP

## 2024-08-01 NOTE — PROGRESS NOTES
"Physical Therapy Examination and Treatment Note    Patient Name: Dimas Pérez  MRN Number: [id number]  Evaluating Clinician: ZABRINA Solano PT  Today's Date: 8/1/2024  Time Calculation  Start Time: 0930  Stop Time: 1015  Time Calculation (min): 45 min    INSURANCE:  Visit Number: 3 of 6  Approved Visits: -Auth Rcvd 5 visits 7/22-9/19 CPTs [id number]    Insurance Info: EVAL ONLY - [facility] - AUTH THRU [employer] / $35 COPAY / OOP $4200 - $208 met / [employer] [id number] / ds 7/15/24.     PRECAUTIONS/SPECIAL CONCERNS/RELEVANT PMHX: CKD, Lung Transplant, asthma, anxiety, osteoporosis, COPD, emphysema,     PT CLINICAL PROBLEM: right calf pain/strain    Chief Medical Dx code: M79.661 (ICD-10-CM) - Right calf pain   1. Right calf pain  Follow Up In Physical Therapy          SUBJECTIVE: twisted to turn 3 weeks ago and felt a severe pain in the right mid calf. He had significant difficulty to walk and and do stairs and needed to walk steps one at a time for 2 weeks. Currently walking slowly and carefully. 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 He has been improving. No symptoms at rest at present or when inactive. He has tried aspirin and saw PMD and advised heat and PT. He feels okay to drive and drove to PT today. Walks without devices. He had considerable swelling in the beginning and had and ultrasound done to rule blood clot and his ankle swelling has gone down some. He tries to play golf once per week, works in his yard and tends to his swimming pool.     7/25/24 no longer painful just some intermittent soreness in the calf able to play golf again just not 100%    8/1/24 doing really good pain free functioning better and has now returned to walking stairs reciprocally \"I was surprised when it decided to stop hurting that was right after last visit\"     TREATMENT:  Symptoms/NPRS before Rx: 0  Symptoms/NPRS after Rx:     Timed Codes: (# minutes per modality and 0 if " omitted)  Ther-Ex 59857: 42  Manual 73858:   NMRe-ed 83191:  Gait 85550:  Stim 42083:  Traction 57736:    8/1/24  Gait train for improved/normalized push off on RLE  Bilateral heel raises at counter 2 x 10 HEP  Uniheel raise at counter 10 reps future HEP when ready  Wall mini squat with heel raise soleus recruitment HEP  2 x 4 light calf stretch bilaterally 2 x 30 ct  Single leg stance 5 x 10 ct HEP    Extensive HEP EDU on markers of improvement, symmetric side to side, strength, power, progressions gradually over the next 6-8 week and current limitations    7/16/23  EDU compression socks for pt. Concerns of swelling in foot, ankle, and calf and/or calf compression sleeve for support in functional activities    OBJECTIVE:  STEADI Fall Risk: 0 (score of 4+ indicates fall risk)   OUTCOME TOOL: LEFS 76/80  Calf girth right 40 cm and left 38.8 cm  Full knee rom no swelling no deformity  Tender mid to lower calf and spasm medial gastroc  Min ankle swelling  Full ankle motion but DF to 20 provokes calf stretch discomfort  DF 5  PF 4- discomfort  Gait min antalgia    ASSESSMENT: improved gait from last visit less flat foot more push off. Calf strength deficits functionally noted. Now that he is feeling better needs to work to get function better to maximize re-injury reduction potential (full functional recovery with resiliance)        PLAN: right calf rx manual massage taping light stretch band exercises heat     Patient/parent/caregiver agreed and consented to plan of care for skilled physical therapy at 1-2 times per week for 4 weeks. Physical Therapy to include modalities prn as indicated, therapeutic exercise, manual therapy, neuromuscular re-education, gait and functional performance training, instruction and practice in a home exercise program (HEP) and self-management skills.     CARE PLAN/GOALS: (met, progressing, not progressing)    STG's: (      weeks /      visits )  1  2    LTG's  (   4-6   weeks /      visits  )  1 LEFS 100% 80/80  2 able to calf raise 10x indicating normal functional strength  3 able to walk stairs reciprocally no pain or limitations  4 normal gait non antalgic  5 The Global Rating of Change Score (GROC) will improve to 5/7 =  “a good deal better” or more.   6 Improve functional outcome tool score (FOTS: RAMIREZ, NDI, ASES, ABC, etc.) by > 10 points for Minimally Important Clinical Difference (MICD).  7 Patient/client will be independent with a HEP and self-management skills to further enhance recovery and progress.  8 Patient/client will verbalize >75% symptom reduction for frequency and severity of symptoms and/or > two point reduction of VAS/NPRS.  9 The Patient Specific Functional Scale metric (PSFS) will improve from baseline value to greater than 80% functional.

## 2024-11-09 ENCOUNTER — DOCUMENTATION (OUTPATIENT)
Dept: PHYSICAL THERAPY | Facility: CLINIC | Age: 69
End: 2024-11-09
Payer: MEDICARE

## 2024-11-09 NOTE — PROGRESS NOTES
Discharge Summary    Name: Dimas Pérez  MRN: [id number]  : 1955  Date: 24    Discharge Summary: PT          Rehab Discharge Reason: Achieved all and/or the most significant goals(s)       Admission Reconciliation is Not Complete  Discharge Reconciliation is Not Complete Admission Reconciliation is Completed  Discharge Reconciliation is Completed